# Patient Record
Sex: FEMALE | Race: AMERICAN INDIAN OR ALASKA NATIVE | URBAN - METROPOLITAN AREA
[De-identification: names, ages, dates, MRNs, and addresses within clinical notes are randomized per-mention and may not be internally consistent; named-entity substitution may affect disease eponyms.]

---

## 2024-02-24 ENCOUNTER — APPOINTMENT (RX ONLY)
Dept: URBAN - METROPOLITAN AREA CLINIC 147 | Facility: CLINIC | Age: 29
Setting detail: DERMATOLOGY
End: 2024-02-24

## 2024-02-24 DIAGNOSIS — L82.0 INFLAMED SEBORRHEIC KERATOSIS: ICD-10-CM

## 2024-02-24 PROCEDURE — ? COUNSELING

## 2024-02-24 PROCEDURE — ? OTHER

## 2024-02-24 PROCEDURE — 99202 OFFICE O/P NEW SF 15 MIN: CPT

## 2024-02-24 ASSESSMENT — LOCATION ZONE DERM: LOCATION ZONE: FACE

## 2024-02-24 ASSESSMENT — LOCATION DETAILED DESCRIPTION DERM: LOCATION DETAILED: RIGHT SUPERIOR MEDIAL MALAR CHEEK

## 2024-02-24 ASSESSMENT — LOCATION SIMPLE DESCRIPTION DERM: LOCATION SIMPLE: RIGHT CHEEK

## 2024-02-24 NOTE — PROCEDURE: OTHER
Other (Free Text): Discussed removal options with patient.\\nPatient will return to clinic for biopsy.
Note Text (......Xxx Chief Complaint.): This diagnosis correlates with the
Detail Level: Zone
Render Risk Assessment In Note?: no